# Patient Record
Sex: FEMALE | Race: BLACK OR AFRICAN AMERICAN | Employment: FULL TIME | URBAN - METROPOLITAN AREA
[De-identification: names, ages, dates, MRNs, and addresses within clinical notes are randomized per-mention and may not be internally consistent; named-entity substitution may affect disease eponyms.]

---

## 2017-04-18 ENCOUNTER — HOSPITAL ENCOUNTER (EMERGENCY)
Age: 42
Discharge: HOME | End: 2017-04-18
Attending: EMERGENCY MEDICINE
Payer: SELF-PAY

## 2017-04-18 VITALS
DIASTOLIC BLOOD PRESSURE: 87 MMHG | SYSTOLIC BLOOD PRESSURE: 138 MMHG | RESPIRATION RATE: 18 BRPM | TEMPERATURE: 98.3 F | HEART RATE: 77 BPM | OXYGEN SATURATION: 100 %
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
## 2017-04-18 DIAGNOSIS — S39.012A BACK STRAIN, INITIAL ENCOUNTER: ICD-10-CM

## 2017-04-18 PROCEDURE — 99281 EMR DPT VST MAYX REQ PHY/QHP: CPT

## 2017-04-18 RX ORDER — CYCLOBENZAPRINE HCL 5 MG
10 TABLET ORAL 3 TIMES DAILY
Qty: 9 TAB | Refills: 0 | Status: SHIPPED | OUTPATIENT
Start: 2017-04-18

## 2017-04-18 RX ORDER — IBUPROFEN 800 MG/1
800 TABLET ORAL EVERY 8 HOURS
Qty: 15 TAB | Refills: 0 | Status: SHIPPED | OUTPATIENT
Start: 2017-04-18 | End: 2017-04-23

## 2017-04-18 NOTE — LETTER
[facility] EMERGENCY DEPT 
[facility] [address] [id number] 
[phone] Work/School Note Date: 4/18/2017 To Whom It May concern: 
 
Katie Camarena was seen and treated today in the emergency room by the following provider(s): 
Attending Provider: Yan Boyd MD.   
 
Katie Camarena may return to work on 4/20/17. Sincerely, Felicitas Ortiz RN
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
## 2017-04-18 NOTE — LETTER
NOTIFICATION RETURN TO WORK / SCHOOL 
 
4/18/2017 1:11 PM 
 
Ms. Che Apodaca [place] [id number] To Whom It May Concern: 
 
Che Apodaca is currently under the care of [facility] EMERGENCY DEPT. She will return to work/school on: 4/19/17. If there are questions or concerns please have the patient contact our office.  
 
 
 
Sincerely, 
 
 
Chepe Shook MD
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
